# Patient Record
Sex: FEMALE | Race: WHITE | NOT HISPANIC OR LATINO | Employment: FULL TIME | URBAN - METROPOLITAN AREA
[De-identification: names, ages, dates, MRNs, and addresses within clinical notes are randomized per-mention and may not be internally consistent; named-entity substitution may affect disease eponyms.]

---

## 2023-12-12 ENCOUNTER — APPOINTMENT (OUTPATIENT)
Dept: UROLOGY | Facility: CLINIC | Age: 33
End: 2023-12-12
Payer: COMMERCIAL

## 2024-03-11 ENCOUNTER — OFFICE VISIT (OUTPATIENT)
Dept: UROLOGY | Facility: CLINIC | Age: 34
End: 2024-03-11
Payer: COMMERCIAL

## 2024-03-11 VITALS
HEART RATE: 109 BPM | SYSTOLIC BLOOD PRESSURE: 120 MMHG | WEIGHT: 222 LBS | DIASTOLIC BLOOD PRESSURE: 73 MMHG | HEIGHT: 70 IN | BODY MASS INDEX: 31.78 KG/M2

## 2024-03-11 DIAGNOSIS — F64.9 GENDER DYSPHORIA: ICD-10-CM

## 2024-03-11 DIAGNOSIS — N89.5 VAGINAL STENOSIS: Primary | ICD-10-CM

## 2024-03-11 PROCEDURE — 1036F TOBACCO NON-USER: CPT | Performed by: UROLOGY

## 2024-03-11 PROCEDURE — 99214 OFFICE O/P EST MOD 30 MIN: CPT | Performed by: UROLOGY

## 2024-03-11 RX ORDER — ESTRADIOL 2 MG/1
4 TABLET ORAL 2 TIMES DAILY
COMMUNITY
Start: 2023-03-13 | End: 2024-03-12

## 2024-03-11 RX ORDER — BUPROPION HYDROCHLORIDE 100 MG/1
100 TABLET, EXTENDED RELEASE ORAL DAILY
COMMUNITY

## 2024-03-11 RX ORDER — PROGESTERONE 100 MG/1
100 CAPSULE ORAL DAILY
COMMUNITY
Start: 2023-06-10

## 2024-03-11 RX ORDER — LISDEXAMFETAMINE DIMESYLATE CAPSULES 20 MG/1
20 CAPSULE ORAL
COMMUNITY

## 2024-03-11 NOTE — PROGRESS NOTES
"Subjective   Patient ID: Lyric Johnston is a 33 y.o. adult who presents for talk about surgery from here on out.    Patient is a 33-year-old trans individual status post vaginoplasty with graft interposition on 01/22/2024 (robotic assisted peritoneal capped vaginoplasty) along with use of myriad matrix by Dr Montes De Oca.   She comes in with difficulty dilating such that she isn't dilating at all because she encounters a wall of resistance 1.5-2 inches in. Otherwise she's doing well. Her urination is okay and clitoral sensation is normal. Her labial sensation is slowly getting better.   She also complains of vaginal bleeding at the time of vaginal manipulation.          Review of Systems   Genitourinary:         Vaginal stenosis       Objective   /73   Pulse 109   Ht 1.765 m (5' 9.5\")   Wt 101 kg (222 lb)   BMI 32.31 kg/m²     Physical Exam    Constitutional: Patient appears well-developed and well-nourished. No acute distress.     Pulmonary/Chest: Effort normal. No respiratory distress.   Abdominal: non distended.  : external genitalia are healing well. Urethra and clitoris appear healthy. Vaginal introitus is palpated with a finger and resistance is encountered about 2 cm in. Sound was used to measure length of canal and it was 5 cm.  Integumentary: No rash or lesions.  Psychiatric: Normal mood and affect. Behavior is normal. Thought content normal.      Assessment/Plan     Discussion:  I also offered taking her to the operating room now and see if we can open up some of the circular scar, and if needed use an oral mucosal graft or fish skin xenograft to line it.  We also discussed a more involved revision of the vaginal canal from a perineal approach if the rest of the canal is also stenosed.   We discussed doing a colovaginoplasty.  I informed her that time is not necessarily of essence in this decision, and we may opt for an elective procedure.   She decided to wait for Dr Montes De Oca to come back to work " and review further options with him to proceed with a solution.     Devinibe Attestation    By signing my name below, I, Shani Madrid attest that this documentation has been prepared under the direction and in the presence of Juve Rivero MD.   All medical record entries made by the Scribe were at my direction or personally dictated by me. I have reviewed the chart and agree that the record accurately reflects my personal performance of the history, physical exam, discussion and plan.